# Patient Record
Sex: MALE | Race: WHITE | URBAN - METROPOLITAN AREA
[De-identification: names, ages, dates, MRNs, and addresses within clinical notes are randomized per-mention and may not be internally consistent; named-entity substitution may affect disease eponyms.]

---

## 2021-11-30 ENCOUNTER — TELEPHONE (OUTPATIENT)
Dept: ORTHOPEDIC SURGERY | Age: 51
End: 2021-11-30

## 2021-11-30 NOTE — TELEPHONE ENCOUNTER
General Question     Subject: PATIENT IS CALLING TO INQUIRE ABOUT THE SHOULDER PROCEDURE THAT DR. OLIVARES PERFORMS - \"SPACER\" SURGERY - HE WOULD LIKE TO KNOW WHAT IT IS CALLED . . . HE IS DOING SOME RESEARCH. HIS DOCTOR HAS REFERRED HIM TO DR. OLIVARES. PLEASE ADVISE PATIENT.     Patient: Tommy Perez Number: [phone]

## 2022-11-21 ENCOUNTER — OFFICE VISIT (OUTPATIENT)
Dept: ORTHOPEDIC SURGERY | Age: 52
End: 2022-11-21
Payer: COMMERCIAL

## 2022-11-21 VITALS — WEIGHT: 230 LBS | BODY MASS INDEX: 31.15 KG/M2 | HEIGHT: 72 IN

## 2022-11-21 DIAGNOSIS — M75.122 COMPLETE TEAR OF LEFT ROTATOR CUFF, UNSPECIFIED WHETHER TRAUMATIC: ICD-10-CM

## 2022-11-21 DIAGNOSIS — M25.512 LEFT SHOULDER PAIN, UNSPECIFIED CHRONICITY: Primary | ICD-10-CM

## 2022-11-21 DIAGNOSIS — M19.012 PRIMARY OSTEOARTHRITIS OF LEFT SHOULDER: ICD-10-CM

## 2022-11-21 PROCEDURE — 99203 OFFICE O/P NEW LOW 30 MIN: CPT | Performed by: PHYSICIAN ASSISTANT

## 2022-11-21 RX ORDER — AMLODIPINE BESYLATE 5 MG/1
TABLET ORAL
COMMUNITY
Start: 2022-10-23

## 2022-11-21 RX ORDER — FOLIC ACID 1 MG/1
TABLET ORAL DAILY
COMMUNITY

## 2022-11-21 RX ORDER — LISINOPRIL AND HYDROCHLOROTHIAZIDE 25; 20 MG/1; MG/1
TABLET ORAL
COMMUNITY
Start: 2022-10-23

## 2022-11-21 RX ORDER — GABAPENTIN 100 MG/1
CAPSULE ORAL
COMMUNITY
Start: 2022-10-24

## 2022-11-21 RX ORDER — VITAMIN B COMPLEX
TABLET ORAL
COMMUNITY

## 2022-11-21 RX ORDER — OXYCODONE AND ACETAMINOPHEN 10; 325 MG/1; MG/1
TABLET ORAL
COMMUNITY
Start: 2022-10-27

## 2022-11-21 RX ORDER — MELOXICAM 15 MG/1
15 TABLET ORAL DAILY
Qty: 30 TABLET | Refills: 3 | Status: SHIPPED | OUTPATIENT
Start: 2022-11-21 | End: 2022-12-21

## 2022-11-21 RX ORDER — GABAPENTIN 600 MG/1
TABLET ORAL
COMMUNITY
Start: 2022-09-14

## 2022-11-21 RX ORDER — ZINC GLUCONATE 50 MG
TABLET ORAL
COMMUNITY

## 2022-11-23 ENCOUNTER — OFFICE VISIT (OUTPATIENT)
Dept: ORTHOPEDIC SURGERY | Age: 52
End: 2022-11-23
Payer: COMMERCIAL

## 2022-11-23 ENCOUNTER — TELEPHONE (OUTPATIENT)
Dept: ORTHOPEDIC SURGERY | Age: 52
End: 2022-11-23

## 2022-11-23 VITALS — HEIGHT: 72 IN | WEIGHT: 230 LBS | BODY MASS INDEX: 31.15 KG/M2

## 2022-11-23 DIAGNOSIS — M75.122 COMPLETE TEAR OF LEFT ROTATOR CUFF, UNSPECIFIED WHETHER TRAUMATIC: Primary | ICD-10-CM

## 2022-11-23 PROCEDURE — 99213 OFFICE O/P EST LOW 20 MIN: CPT | Performed by: ORTHOPAEDIC SURGERY

## 2022-11-23 NOTE — PROGRESS NOTES
Chief Complaint    Shoulder Pain (NP LEFT SHOULDER)      History of Present Illness:  Ethan Robert is a pleasant, 46 y.o., male, here today for follow up of his left shoulder. This patient was seen by Alcidse Hall PA-C, Dr. Nohemi Calix physician's assistant on 11/21/22. To recap, he is being referred to us by Dr. Boubacar Benitez for evaluation for a balloon arthroplasty. The patient reports he has a history of undergoing bilateral shoulder rotator cuff repairs with Dr. Roman Aguilar. He reports that the right side was done in 2006. He was able to rehab that shoulder and did quite well. The left side was done in 2010. After his left shoulder surgery patient admits that he returned back to work too soon and could have done something to hinder his recovery. He felt that the left shoulder never felt quite right. Additionally he states that roughly 4 years ago he sustained a fall wherein he slipped and fell off of a ladder onto his left shoulder. He has had an MRI which showed a massive retracted rotator cuff tear with moderate atrophy of the supraspinatus and infraspinatus from his 2018 MRI. He had a more recent MRI which showed further atrophy and retraction of his rotator cuff muscle. This is 4th opinion on next treatment steps. Sushma agreed that he appeared to be a good candidate for a balloon arthroplasty. He is here today to discuss his surgical options. Pain Assessment  Location of Pain: Shoulder  Location Modifiers: Left  Severity of Pain: 8  Quality of Pain: Aching, Dull, Sharp, Throbbing  Duration of Pain: Persistent  Frequency of Pain: Constant  Aggravating Factors:  Other (Comment), Exercise, Straightening, Stretching  Limiting Behavior: Yes  Relieving Factors: Rest  Work-Related Injury: No  Are there other pain locations you wish to document?: No      Medical History:  Patient's medications, allergies, past medical, surgical, social and family histories were reviewed and updated as appropriate. No notes on file    Review of Systems  A 14 point review of systems was completed by the patient and is available in the media section of the scanned medical record and was reviewed on 11/23/2022. The review is negative with the exception of those things mentioned in the HPI and Past Medical History    Vital Signs: There were no vitals filed for this visit. General/Appearance: Alert and oriented and in no apparent distress. Skin:  There are no skin lesions, cellulitis, or extreme edema. The patient has warm and well-perfused Bilateral upper extremities with brisk capillary refill. Left Shoulder Exam:  Inspection: Well healed surgical scars. No gross deformities, no signs of infection. Palpation: Tenderness over the rotator cuff footprint, glenohumeral and subacromial crepitus    Active Range of Motion: Forward Elevation 160, Abduction 160, External Rotation 20, Internal Rotation T10    Passive Range of Motion: Deferred    Strength:  External Rotation 4/5, Internal Rotation 5/5, Champagne Toast 4-/5, Supraspinatus 4-/5, Deltoid 5/5    Special Tests:  Positive Puja's,  No Yrn muscle deformity. Neurovascular: Sensation to light touch is intact, no motor deficits, palpable radial pulses 2+      Radiology:     No new XR obtained at this time. We reviewed both the most recent MRI as well XR obtained on 11/21/22. Assessment :  Primo Jamil is a 46 y.o. male with left shoulder pain and weakness related to a massive irreparable rotator cuff tear in the setting of early glenohumeral osteoarthritis. He is a good candidate for the subacromial balloon spacer. Impression:  Encounter Diagnosis   Name Primary?     Complete tear of left rotator cuff, unspecified whether traumatic Yes       Office Procedures:  Orders Placed This Encounter   Procedures    MRI SHOULDER LEFT WO CONTRAST     Standing Status:   Future     Standing Expiration Date:   11/23/2023     Order Specific Question:   Reason for exam:     Answer:   [facility] [place]     Order Specific Question:   What is the sedation requirement? Answer:   None         Treatment Plan: We reviewed pertinent imaging and examination findings with Sammi Harada. We discussed diagnosis and treatment options at length. We discussed both conservative vs surgical treatment options. Conservatively we may offer a intra-articular corticosteroid injection coupled with formal physical therapy to maintain and preserve motion. Regarding surgical treatment options, we discussed a reverse total shoulder replacement, however given his well preserved ROM we do not feel he is a real candidate for this operation at this time. He may require this operation down the road. Another surgical option is a superior capsule reconstruction. We explained this is a lengthy recovery and today he compensates too well in terms of overall function. Our recommendation today is an arthroscopic insertion of balloon spacer. We discussed in detail risks, benefits and expectations postoperatively. We also discussed a recovery timeline following this operation. We would need an updated MRI for pre-operative planning. After discussing these options he would like to move forward with the balloon arthroplasty. We will begin to plan for this operation. He would like to get this done before the end of the year as he has already met his deductive. We will go ahead and provide surgical dates for the patient. We will go ahead and order the MRI for pre-operative planning. We would like to see him back in clinic to review those results and to fit him with an Ultrasling brace. We will see Carla back in 1-2 weeks and/or as needed. All questions were answered to patient's satisfaction and He was encouraged to call with any further questions or concerns. Sammi Harada is in agreement with this plan.     11/23/2022  12:01 PM    Cesia Monroe ATC   [address]    During this examination, I, Christ Libman, functioned as a scribe for Dr. Laisha Buenrostro. The history taking and physical examination were performed by Dr. Radha Knowles. All counseling during the appointment was performed between the patient and Dr. Radha Knowles. 11/23/22  ______________  I, Dr. Laisha Buenrostro, personally performed the services described in this documentation as described by Scott Higgins ATC in my presence, and it is both accurate and complete. Amalia Knowles MD, PhD  11/23/2022

## 2022-11-23 NOTE — TELEPHONE ENCOUNTER
General Question     Subject: L SHOULDER SX  Patient and /or Facility Request: Esha Rozina  Contact Number: [phone]    PATIENT CALLED IN TO LET THE OFFICE KNOW HE DECIDED TO HAVE HIS SX ON DEC 16.22. Merline Husain PATIENT WANTS TO MAKE SURE HE CAN STILL MAKE THIS DATE FOR HIS SX. .. PLEASE CALL PATIENT BACK AT THE ABOVE NUMBER. ..

## 2022-11-30 ENCOUNTER — TELEPHONE (OUTPATIENT)
Dept: ORTHOPEDIC SURGERY | Age: 52
End: 2022-11-30